# Patient Record
Sex: FEMALE | Race: WHITE | NOT HISPANIC OR LATINO
[De-identification: names, ages, dates, MRNs, and addresses within clinical notes are randomized per-mention and may not be internally consistent; named-entity substitution may affect disease eponyms.]

---

## 2017-01-05 ENCOUNTER — TRANSCRIPTION ENCOUNTER (OUTPATIENT)
Age: 27
End: 2017-01-05

## 2017-06-27 ENCOUNTER — OTHER (OUTPATIENT)
Age: 27
End: 2017-06-27

## 2017-10-11 ENCOUNTER — APPOINTMENT (OUTPATIENT)
Dept: ORTHOPEDIC SURGERY | Facility: CLINIC | Age: 27
End: 2017-10-11

## 2019-04-09 ENCOUNTER — TRANSCRIPTION ENCOUNTER (OUTPATIENT)
Age: 29
End: 2019-04-09

## 2019-04-15 ENCOUNTER — APPOINTMENT (OUTPATIENT)
Dept: ORTHOPEDIC SURGERY | Facility: CLINIC | Age: 29
End: 2019-04-15
Payer: MEDICAID

## 2019-04-15 VITALS
HEIGHT: 62 IN | WEIGHT: 229 LBS | BODY MASS INDEX: 42.14 KG/M2 | DIASTOLIC BLOOD PRESSURE: 80 MMHG | HEART RATE: 86 BPM | SYSTOLIC BLOOD PRESSURE: 125 MMHG

## 2019-04-15 DIAGNOSIS — S82.891A OTHER FRACTURE OF RIGHT LOWER LEG, INITIAL ENCOUNTER FOR CLOSED FRACTURE: ICD-10-CM

## 2019-04-15 PROCEDURE — 73630 X-RAY EXAM OF FOOT: CPT | Mod: RT

## 2019-04-15 PROCEDURE — 99214 OFFICE O/P EST MOD 30 MIN: CPT

## 2019-04-15 NOTE — HISTORY OF PRESENT ILLNESS
[FreeTextEntry1] : 28 year year old female presenting with right foot pain. The patient’s pain is noted to be a 6/10. The patient's pain began in March of 2019. The patient's pain is described as intermittent and burning in nature. The patient states that the burning pain has occurred since February of 2019.  She notes that she gets pain in the outside of her foot while walking. The patient states that her pain is at its worst while walking. She notes that she experiences  severe pain and swelling of the right foot while walking. The patient states that she sustained a right ankle fx in 2001. She notes that she has had chronic RLE pain since the fx. She is currently taking no pain medication. She presents wearing sneakers. The patient is accompanied by a male individual. No other complaints at this time.

## 2019-04-15 NOTE — CONSULT LETTER
[Consult Letter:] : I had the pleasure of evaluating your patient, [unfilled]. [Consult Closing:] : Thank you very much for allowing me to participate in the care of this patient.  If you have any questions, please do not hesitate to contact me. [Please see my note below.] : Please see my note below. [Sincerely,] : Sincerely, [FreeTextEntry2] : SELF,REFERRED [FreeTextEntry3] : Luke Jerome

## 2019-04-15 NOTE — ADDENDUM
[FreeTextEntry1] : I, Speedy Dover, acted solely as a scribe for Dr. Luke Jerome on this date 04/15/2019  .\par  \par All medical record entries made by the Scribe were at my, Dr. Luke Jerome, direction and personally dictated by me on 04/15/2019 . I have reviewed the chart and agree that the record accurately reflects my personal performance of the history, physical exam, assessment and plan. I have also personally directed, reviewed, and agreed with the chart.\par \par \par

## 2019-04-15 NOTE — PHYSICAL EXAM
[de-identified] : General: Alert and oriented x3. In no acute distress. Pleasant in nature with a normal affect. No apparent respiratory distress.\par \par R Foot Exam \par Skin: Clean, dry, intact\par Inspection: No obvious malalignment, no masses, no swelling, no effusion\par Pulses: 2+ DP/PT pulses\par ROM: FOOT Full ROM of digits, ANKLE 10 degrees of dorsiflexion, 40 degrees of plantarflexion, 10 degrees of subtalar motion.\par Painful ROM: None\par Tenderness: +Lis franc region pain. +Distal 3rd 5th MT pain.No tenderness over the medial malleolus, no tenderness over the lateral malleolus, no CFL/ATFL/PTFL pain. No deltoid ligament pain. No heel pain. No  Achilles tenderness. No pain to the LisFranc joint. No ttp over the posterior tibial tendon.\par Stability: Negative anterior/posterior drawer.\par Strength: 5/5 ADD/ABD/TA/GS/EHL/FHL/EDL\par Neuro: Sensation in tact to light touch throughout\par Additional tests: No subluxating or dislocating peroneals. Negative Mortons test, negative tarsal tunnel tinels, negative single heel rise\par \par Bilateral Standing Exam\par External rotation of the right leg. \par \par \par   [de-identified] : 3V of the R foot were ordered obtained and reviewed by me today, 04/15/2019 , revealed: Good bone alignment. No fx. \par \par \par \par

## 2019-04-15 NOTE — DISCUSSION/SUMMARY
[de-identified] : Today I had a lengthy discussion with the patient regarding their right foot pain.I have addressed all the patient's concerns surrounding the pathology of their condition. At this time, I would like to obtain advanced imaging of the patient's right foot in order to rule out a stress fx of the 5th MT. An MRI was ordered of the right foot in order to better understand the etiology of the patient’s condition. She should follow up with the office after completing the MRI. A discussion was had about the possible use of custom vs OTC orthotic inserts. At this time I recommend that the patient start with OTC orthotics. If her pain does not improve with the use of the OTC orthotics then the custom orthotics will be discussed at the patient's follow up visit. Regarding the OTC orthotics, I suggested that the patient utilize Dr. Buck's inserts. I recommend that the patient utilize OTC Biofreeze topically as instructed. I advised the patient to utilize ice, NSAIDs PRN, and heat. They can also elevate their right foot above the level of their heart. The patient understood and verbally agreed to the treatment plan. All of their questions were answered and they were satisfied with the visit. The patient should call the office if they have any questions or experience worsening symptoms.

## 2019-04-16 ENCOUNTER — OUTPATIENT (OUTPATIENT)
Dept: OUTPATIENT SERVICES | Facility: HOSPITAL | Age: 29
LOS: 1 days | End: 2019-04-16

## 2019-04-16 ENCOUNTER — APPOINTMENT (OUTPATIENT)
Dept: MRI IMAGING | Facility: CLINIC | Age: 29
End: 2019-04-16
Payer: MEDICAID

## 2019-04-16 DIAGNOSIS — M25.571 PAIN IN RIGHT ANKLE AND JOINTS OF RIGHT FOOT: ICD-10-CM

## 2019-04-16 PROCEDURE — 73718 MRI LOWER EXTREMITY W/O DYE: CPT | Mod: 26,RT

## 2019-05-01 ENCOUNTER — APPOINTMENT (OUTPATIENT)
Dept: ORTHOPEDIC SURGERY | Facility: CLINIC | Age: 29
End: 2019-05-01
Payer: MEDICAID

## 2019-05-01 PROCEDURE — 99214 OFFICE O/P EST MOD 30 MIN: CPT

## 2019-05-01 NOTE — DISCUSSION/SUMMARY
[de-identified] : Today in the office I had a lengthy discussion with the patient regarding her R foot pain. I have addressed all of the patient's concern surrounding the pathology of their conditions.  I had went over her  MRI result with her in great detail and she has demonstrated understanding. At this time, I am suggesting she f/u with an EMG for the interim. She will f/u with me once she has completed the study. The pt is to call me as soon as possible if they notice any worsening pain or symptoms.  All questions were answered and the patient verbalized understanding. The patient is in agreement with this treatment plan.

## 2019-05-01 NOTE — PHYSICAL EXAM
[de-identified] : General: Alert and oriented x3. In no acute distress. Pleasant in nature with a normal affect. No apparent respiratory distress.\par \par R Foot Exam \par Skin: Clean, dry, intact\par Inspection: No obvious malalignment, no masses, no swelling, no effusion\par Pulses: 2+ DP/PT pulses\par ROM: FOOT Full ROM of digits, ANKLE 10 degrees of dorsiflexion, 40 degrees of plantarflexion, 10 degrees of subtalar motion.\par Painful ROM: None\par Tenderness: +Lis franc region pain. +Distal 3rd 5th MT pain.No tenderness over the medial malleolus, no tenderness over the lateral malleolus, no CFL/ATFL/PTFL pain. No deltoid ligament pain. No heel pain. No  Achilles tenderness. No pain to the LisFranc joint. No ttp over the posterior tibial tendon.\par Stability: Negative anterior/posterior drawer.\par Strength: 5/5 ADD/ABD/TA/GS/EHL/FHL/EDL\par Neuro: Sensation in tact to light touch throughout\par Additional tests: No subluxating or dislocating peroneals. Negative Mortons test, negative tarsal tunnel tinels, negative single heel rise\par \par Bilateral Standing Exam\par External rotation of the right leg. \par \par \par   [de-identified] :  MR FOOT RT from [facility] on 04/16/2019 revealed No stress fracture or stress reaction. No plantar plate tear.

## 2019-05-01 NOTE — HISTORY OF PRESENT ILLNESS
[FreeTextEntry1] : Pt is a 28 year old  F present in the office today in regards to her R foot pain. Pt is here for an MRI review. Pt c/o burning even from laying in bed. Her current pain level is a 6/10. Pt states their pain and swelling is unchanged as compared to the previous visit. She is not currently taking any pain medications at this moment. Pt is accompanied by her father.  Pt is present at the appointment wearing sneakers. No other complaints at this time.

## 2019-05-01 NOTE — ADDENDUM
[FreeTextEntry1] : Documented by Imelda Arteaga acting as a scribe for Dr. Jerome on 05/01/2019 \par \par All medical record entries made by the Scribe were at my, Dr. Petersen, direction and\par personally dictated by me on 05/01/2019 . I have reviewed the chart and agree that the record\par accurately reflects my personal performance of the history, physical exam, procedure and imaging.

## 2020-01-27 ENCOUNTER — LABORATORY RESULT (OUTPATIENT)
Age: 30
End: 2020-01-27

## 2020-01-27 ENCOUNTER — APPOINTMENT (OUTPATIENT)
Dept: ORTHOPEDIC SURGERY | Facility: CLINIC | Age: 30
End: 2020-01-27
Payer: MEDICAID

## 2020-01-27 LAB
INR PPP: 0.93 RATIO
PT BLD: 10.5 SEC

## 2020-01-27 PROCEDURE — 99213 OFFICE O/P EST LOW 20 MIN: CPT

## 2020-01-27 PROCEDURE — 73630 X-RAY EXAM OF FOOT: CPT | Mod: RT

## 2020-01-27 NOTE — PHYSICAL EXAM
[de-identified] : General: Alert and oriented x3. In no acute distress. Pleasant in nature with a normal affect. No apparent respiratory distress.\par R Foot Exam \par Skin: Clean, dry, intact\par Inspection: No obvious malalignment, no masses, no swelling, no effusion\par Pulses: 2+ DP/PT pulses\par ROM: FOOT Full ROM of digits, ANKLE 10 degrees of dorsiflexion, 40 degrees of plantarflexion, 10 degrees of subtalar motion.\par Painful ROM: None\par Tenderness: + slight proximal medial tenderness to the leg. No bony tenderness. No tenderness over the medial malleolus, no tenderness over the lateral malleolus, no CFL/ATFL/PTFL pain. No deltoid ligament pain. No heel pain. No  Achilles tenderness. No pain to the LisFranc joint. No ttp over the posterior tibial tendon.\par Stability: Negative anterior/posterior drawer.\par Strength: 5/5 ADD/ABD/TA/GS/EHL/FHL/EDL\par Neuro: Sensation in tact to light touch throughout\par Additional tests: No subluxating or dislocating peroneals. Negative Mortons test, negative tarsal tunnel tinels, negative single heel rise\par \par Bilateral Standing Exam\par External rotation of the right leg. [de-identified] : MRI of the right foot obtained from an outside facility on 04/16/2019 and reviewed in the office today, 01/27/2020, revealed: No stress fracture of stress reaction. No plantar plate tear.\par \par 3V of the right foot were ordered obtained and reviewed by me today, 01/27/2020, revealed: no acute fracture

## 2020-01-27 NOTE — HISTORY OF PRESENT ILLNESS
[FreeTextEntry1] : 29 year old female presenting with right foot pain. The patient’s pain is noted to be a 6/10. The patient describes her pain as burning in quality. She also complains of stiffness. The pain and swelling are both noted to be the same compared to the previous visit. The patient reports that she could not sit for the EMG. The patient reports that she tried returning to the gym and tried the elliptical for 5 minutes and then the bicycle for 10 minutes with pain. She presents today to review her MRI results. She is currently taking Advil BID. No other complaints at this time.

## 2020-01-27 NOTE — ADDENDUM
[FreeTextEntry1] : I, Frank Corral, acted solely as a scribe for Dr. Luke Jerome on this date 01/27/2020. \par \par All medical record entries made by the Scribe were at my, Dr. Luke Jerome, direction and personally dictated by me on 01/27/2020 . I have reviewed the chart and agree that the record accurately reflects my personal performance of the history, physical exam, assessment and plan. I have also personally directed, reviewed, and agreed with the chart.\par \par

## 2020-01-27 NOTE — DISCUSSION/SUMMARY
[de-identified] : Today I had a lengthy discussion with the patient regarding her right foot pain. I have addressed all the patient's concerns surrounding the pathology of her condition. I recommend that the patient utilize an OTC lidocaine gel topically. At this time I would like to obtain blood work to further evaluate the patient's condition. The blood work was ordered in the office today. A discussion was had about physical therapy vs. a repeat MRI vs. a rheumatology consult vs. a pain management consult. I would like to see the patient back in the office after obtaining the blood work to reassess her condition. The patient understood and verbally agreed to the treatment plan. All of her questions were answered and she was satisfied with the visit. The patient should call the office if she has any questions or experience worsening symptoms.

## 2020-01-28 LAB
ALBUMIN SERPL ELPH-MCNC: 4.4 G/DL
ALP BLD-CCNC: 100 U/L
ALT SERPL-CCNC: 8 U/L
ANION GAP SERPL CALC-SCNC: 14 MMOL/L
AST SERPL-CCNC: 13 U/L
BASOPHILS # BLD AUTO: 0 K/UL
BASOPHILS NFR BLD AUTO: 0 %
BILIRUB SERPL-MCNC: 0.4 MG/DL
BUN SERPL-MCNC: 9 MG/DL
CALCIUM SERPL-MCNC: 9.8 MG/DL
CHLORIDE SERPL-SCNC: 100 MMOL/L
CO2 SERPL-SCNC: 24 MMOL/L
CREAT SERPL-MCNC: 0.65 MG/DL
CRP SERPL-MCNC: 3.57 MG/DL
EOSINOPHIL # BLD AUTO: 0.49 K/UL
EOSINOPHIL NFR BLD AUTO: 3.5 %
ERYTHROCYTE [SEDIMENTATION RATE] IN BLOOD BY WESTERGREN METHOD: 89 MM/HR
ESTIMATED AVERAGE GLUCOSE: 131 MG/DL
GLUCOSE SERPL-MCNC: 125 MG/DL
HBA1C MFR BLD HPLC: 6.2 %
HCT VFR BLD CALC: 37.1 %
HGB BLD-MCNC: 10.7 G/DL
LYMPHOCYTES # BLD AUTO: 3.32 K/UL
LYMPHOCYTES NFR BLD AUTO: 23.5 %
MAN DIFF?: NORMAL
MCHC RBC-ENTMCNC: 20 PG
MCHC RBC-ENTMCNC: 28.8 GM/DL
MCV RBC AUTO: 69.2 FL
MONOCYTES # BLD AUTO: 0.37 K/UL
MONOCYTES NFR BLD AUTO: 2.6 %
NEUTROPHILS # BLD AUTO: 9.83 K/UL
NEUTROPHILS NFR BLD AUTO: 68.7 %
PLATELET # BLD AUTO: 394 K/UL
POTASSIUM SERPL-SCNC: 4.2 MMOL/L
PROT SERPL-MCNC: 7.3 G/DL
RBC # BLD: 5.36 M/UL
RBC # FLD: 15.9 %
RHEUMATOID FACT SER QL: <10 IU/ML
SODIUM SERPL-SCNC: 138 MMOL/L
TSH SERPL-ACNC: 2.5 UIU/ML
URATE SERPL-MCNC: 5.9 MG/DL
WBC # FLD AUTO: 14.14 K/UL

## 2020-01-29 LAB
24R-OH-CALCIDIOL SERPL-MCNC: 109 PG/ML
B BURGDOR AB SER-IMP: NEGATIVE
B BURGDOR IGM PATRN SER IB-IMP: NEGATIVE
B BURGDOR18KD IGG SER QL IB: NORMAL
B BURGDOR23KD IGG SER QL IB: NORMAL
B BURGDOR23KD IGM SER QL IB: NORMAL
B BURGDOR28KD IGG SER QL IB: NORMAL
B BURGDOR30KD IGG SER QL IB: NORMAL
B BURGDOR31KD IGG SER QL IB: NORMAL
B BURGDOR39KD IGG SER QL IB: NORMAL
B BURGDOR39KD IGM SER QL IB: NORMAL
B BURGDOR41KD IGG SER QL IB: PRESENT
B BURGDOR41KD IGM SER QL IB: NORMAL
B BURGDOR45KD IGG SER QL IB: NORMAL
B BURGDOR58KD IGG SER QL IB: NORMAL
B BURGDOR66KD IGG SER QL IB: NORMAL
B BURGDOR93KD IGG SER QL IB: NORMAL
HLA-B27 RELATED AG QL: NORMAL

## 2020-01-30 LAB — ANA SER IF-ACNC: NEGATIVE

## 2020-02-03 ENCOUNTER — APPOINTMENT (OUTPATIENT)
Dept: ORTHOPEDIC SURGERY | Facility: CLINIC | Age: 30
End: 2020-02-03

## 2020-05-27 ENCOUNTER — APPOINTMENT (OUTPATIENT)
Dept: RHEUMATOLOGY | Facility: CLINIC | Age: 30
End: 2020-05-27

## 2020-06-03 ENCOUNTER — APPOINTMENT (OUTPATIENT)
Dept: RHEUMATOLOGY | Facility: CLINIC | Age: 30
End: 2020-06-03

## 2020-07-08 ENCOUNTER — LABORATORY RESULT (OUTPATIENT)
Age: 30
End: 2020-07-08

## 2020-07-08 ENCOUNTER — APPOINTMENT (OUTPATIENT)
Dept: RHEUMATOLOGY | Facility: CLINIC | Age: 30
End: 2020-07-08
Payer: MEDICAID

## 2020-07-08 VITALS
RESPIRATION RATE: 16 BRPM | HEART RATE: 120 BPM | SYSTOLIC BLOOD PRESSURE: 138 MMHG | DIASTOLIC BLOOD PRESSURE: 84 MMHG | HEIGHT: 62 IN | OXYGEN SATURATION: 97 % | BODY MASS INDEX: 41.96 KG/M2 | WEIGHT: 228 LBS

## 2020-07-08 VITALS — TEMPERATURE: 98.3 F

## 2020-07-08 DIAGNOSIS — K82.9 DISEASE OF GALLBLADDER, UNSPECIFIED: ICD-10-CM

## 2020-07-08 PROCEDURE — 99205 OFFICE O/P NEW HI 60 MIN: CPT

## 2020-07-08 RX ORDER — DICLOFENAC SODIUM 10 MG/G
1 GEL TOPICAL
Qty: 1 | Refills: 3 | Status: DISCONTINUED | COMMUNITY
Start: 2019-05-01 | End: 2020-07-08

## 2020-07-08 RX ORDER — MONTELUKAST SODIUM 10 MG/1
TABLET, FILM COATED ORAL
Refills: 0 | Status: ACTIVE | COMMUNITY

## 2020-07-08 NOTE — HISTORY OF PRESENT ILLNESS
[FreeTextEntry1] : 29 year old female with PMH of depression, fibromyalgia, presents today for an initial evaluation. \par \par Reports to have chronic, 2 year hx of intermittent right foot and ankle pain. Denies swelling. Pain is dull/aching/burning sensation. Has tried NSAIDs with no relief of symptoms.  Denies trauma. Denies tick bites. Symptoms worse with ambulating, worse with activity. Evaluated by ortho- s/p MRI Right foot 04/2019: unremarkable. Labs with anemia, elevated inflammatory markers. Evaluated by neurology who recommended EMG. However pt not able to tolerate study. \par \par Denies pain/swelling to other joints. Notes to have prior hx of Right ankle fx. denies asymmetry of temperature/ skin or color changes, edema, sweating, trophic changes\par \par She is up to date with age appropriate malignancy screening. \par \par denies fever, chills, fatigue, malaise, denies dry eye,eye pain, eye redness, vision changes, dry mouth, oral ulcers, nasal congestion, difficulty swallowing,  denies ear pain, hearing changes, denies chest pain, chest palpitations, denies sob, denies nausea, vomiting, abdominal pain, diarrhea, constipation, blood in stool, denies dysuria, blood in the urine,  denies rashes, photosensitivity, hair loss, skin thickening, denies blood clots,  raynauds

## 2020-07-08 NOTE — PHYSICAL EXAM
[General Appearance - In No Acute Distress] : in no acute distress [General Appearance - Well Nourished] : well nourished [General Appearance - Alert] : alert [General Appearance - Well Developed] : well developed [PERRL With Normal Accommodation] : pupils were equal in size, round, and reactive to light [Outer Ear] : the ears and nose were normal in appearance [Sclera] : the sclera and conjunctiva were normal [Neck Appearance] : the appearance of the neck was normal [Auscultation Breath Sounds / Voice Sounds] : lungs were clear to auscultation bilaterally [Heart Sounds] : normal S1 and S2 [Heart Rate And Rhythm] : heart rate was normal and rhythm regular [Murmurs] : no murmurs [Heart Sounds Gallop] : no gallops [Heart Sounds Pericardial Friction Rub] : no pericardial rub [Bowel Sounds] : normal bowel sounds [Abdomen Soft] : soft [No CVA Tenderness] : no ~M costovertebral angle tenderness [Abdomen Tenderness] : non-tender [No Spinal Tenderness] : no spinal tenderness [Nail Clubbing] : no clubbing  or cyanosis of the fingernails [Abnormal Walk] : normal gait [Musculoskeletal - Swelling] : no joint swelling seen [] : no rash [Skin Lesions] : no skin lesions [Motor Tone] : muscle strength and tone were normal [Oriented To Time, Place, And Person] : oriented to person, place, and time [No Focal Deficits] : no focal deficits

## 2020-07-08 NOTE — ASSESSMENT
[FreeTextEntry1] : 29 year old female with PMH of depression, fibromyalgia, presents today for an initial evaluation. Reports to have chronic, 2 year hx of intermittent right foot and ankle pain. Denies swelling. Denies trauma. Denies tick bites. as tried NSAIDs with no relief of symptoms.  MRI Right foot 04/2019: unremarkable. Labs with anemia, elevated inflammatory markers. Evaluated by neurology who recommended EMG. However pt not able to tolerate study. \par ROS and PE otherwise unremarkable for underlying classic CTD/ systemic autoimmune disease. \par \par Unclear etiology of symptoms at this point. RDS?? as pt has hx of Right ankle fx. denies asymmetry of temperature/ skin or color changes, edema, sweating, trophic changes. \par \par - trend inflammatory makers, labs as below. she is up to date with age appropriate malignancy screening. \par - on duloxetine 30 mg daily for depression\par - start gabapentin 300mg daily (can up titrate if tolerates)\par - neurology f.u for EMG\par \par Discussed treatment plan with the patient. The patient was given the opportunity to ask questions and all questions were answered to their satisfaction.

## 2020-07-21 LAB
ALBUMIN MFR SERPL ELPH: 54.9 %
ALBUMIN SERPL-MCNC: 3.9 G/DL
ALBUMIN/GLOB SERPL: 1.2 RATIO
ALPHA1 GLOB MFR SERPL ELPH: 5.3 %
ALPHA1 GLOB SERPL ELPH-MCNC: 0.4 G/DL
ALPHA2 GLOB MFR SERPL ELPH: 11 %
ALPHA2 GLOB SERPL ELPH-MCNC: 0.8 G/DL
B-GLOBULIN MFR SERPL ELPH: 14 %
B-GLOBULIN SERPL ELPH-MCNC: 1 G/DL
GAMMA GLOB FLD ELPH-MCNC: 1.1 G/DL
GAMMA GLOB MFR SERPL ELPH: 14.8 %
IGA 24H UR QL IFE: NORMAL
INTERPRETATION SERPL IEP-IMP: NORMAL
M PROTEIN SPEC IFE-MCNC: NORMAL
PROT SERPL-MCNC: 7.1 G/DL
PROT SERPL-MCNC: 7.1 G/DL

## 2020-08-12 ENCOUNTER — APPOINTMENT (OUTPATIENT)
Dept: RHEUMATOLOGY | Facility: CLINIC | Age: 30
End: 2020-08-12
Payer: MEDICAID

## 2020-08-12 VITALS
OXYGEN SATURATION: 98 % | SYSTOLIC BLOOD PRESSURE: 120 MMHG | HEIGHT: 62 IN | DIASTOLIC BLOOD PRESSURE: 80 MMHG | BODY MASS INDEX: 41.96 KG/M2 | WEIGHT: 228 LBS | RESPIRATION RATE: 17 BRPM | HEART RATE: 118 BPM | TEMPERATURE: 98.1 F

## 2020-08-12 DIAGNOSIS — R70.0 ELEVATED ERYTHROCYTE SEDIMENTATION RATE: ICD-10-CM

## 2020-08-12 DIAGNOSIS — R79.82 ELEVATED C-REACTIVE PROTEIN (CRP): ICD-10-CM

## 2020-08-12 PROCEDURE — 99213 OFFICE O/P EST LOW 20 MIN: CPT

## 2020-08-12 RX ORDER — FLUTICASONE PROPIONATE AND SALMETEROL 500; 50 UG/1; UG/1
POWDER RESPIRATORY (INHALATION)
Refills: 0 | Status: DISCONTINUED | COMMUNITY
End: 2020-08-12

## 2020-08-12 RX ORDER — NORETHINDRONE AND ETHINYL ESTRADIOL AND FERROUS FUMARATE 0.8-25(24)
0.8-25 KIT ORAL
Refills: 0 | Status: DISCONTINUED | COMMUNITY
End: 2020-08-12

## 2020-08-12 RX ORDER — ALBUTEROL SULFATE 4 MG/1
TABLET ORAL
Refills: 0 | Status: DISCONTINUED | COMMUNITY
End: 2020-08-12

## 2020-08-12 RX ORDER — FLUTICASONE PROPIONATE 50 UG/1
50 SPRAY, METERED NASAL
Refills: 0 | Status: DISCONTINUED | COMMUNITY
End: 2020-08-12

## 2020-08-12 RX ORDER — MIRTAZAPINE 15 MG/1
15 TABLET, FILM COATED ORAL
Refills: 0 | Status: DISCONTINUED | COMMUNITY
End: 2020-08-12

## 2020-08-12 NOTE — PHYSICAL EXAM
[General Appearance - Alert] : alert [General Appearance - Well Nourished] : well nourished [General Appearance - In No Acute Distress] : in no acute distress [General Appearance - Well Developed] : well developed [PERRL With Normal Accommodation] : pupils were equal in size, round, and reactive to light [Sclera] : the sclera and conjunctiva were normal [Neck Appearance] : the appearance of the neck was normal [Outer Ear] : the ears and nose were normal in appearance [Auscultation Breath Sounds / Voice Sounds] : lungs were clear to auscultation bilaterally [Heart Sounds Gallop] : no gallops [Heart Sounds] : normal S1 and S2 [Heart Rate And Rhythm] : heart rate was normal and rhythm regular [Heart Sounds Pericardial Friction Rub] : no pericardial rub [Murmurs] : no murmurs [Bowel Sounds] : normal bowel sounds [Abdomen Tenderness] : non-tender [Abdomen Soft] : soft [No CVA Tenderness] : no ~M costovertebral angle tenderness [No Spinal Tenderness] : no spinal tenderness [Abnormal Walk] : normal gait [Nail Clubbing] : no clubbing  or cyanosis of the fingernails [Musculoskeletal - Swelling] : no joint swelling seen [] : no rash [Motor Tone] : muscle strength and tone were normal [Skin Lesions] : no skin lesions [No Focal Deficits] : no focal deficits [Oriented To Time, Place, And Person] : oriented to person, place, and time

## 2020-08-12 NOTE — ASSESSMENT
[FreeTextEntry1] : 29 year old female with PMH of depression, fibromyalgia, presents today for f/u visit. Reports to have chronic, 2 year hx of intermittent right foot and ankle pain. Denies swelling. Denies trauma.  MRI Right foot 04/2019: unremarkable. Labs with anemia, elevated inflammatory markers. Evaluated by neurology who recommended EMG. However pt not able to tolerate study. \par \par Currently doing well on gabapentin 300mg TID. Tolerating medication well. Denies side effects.\par ROS and PE otherwise unremarkable for underlying classic CTD/ systemic autoimmune disease. \par \par Unclear etiology of symptoms at this point. RDS?? as pt has hx of Right ankle fx. denies asymmetry of temperature/ skin or color changes, edema, sweating, trophic changes. \par \par - trend inflammatory makers, labs as below. she is up to date with age appropriate malignancy screening. \par - on duloxetine 30 mg daily for depression, consider increasing dose  \par - c/w gabapentin 300mg TID (can up titrate if tolerates)\par - neurology f.u for EMG\par \par Discussed treatment plan with the patient. The patient was given the opportunity to ask questions and all questions were answered to their satisfaction.

## 2020-11-11 ENCOUNTER — APPOINTMENT (OUTPATIENT)
Dept: RHEUMATOLOGY | Facility: CLINIC | Age: 30
End: 2020-11-11
Payer: MEDICAID

## 2020-11-11 VITALS
SYSTOLIC BLOOD PRESSURE: 116 MMHG | RESPIRATION RATE: 17 BRPM | TEMPERATURE: 98 F | HEIGHT: 62 IN | HEART RATE: 111 BPM | DIASTOLIC BLOOD PRESSURE: 80 MMHG | OXYGEN SATURATION: 97 %

## 2020-11-11 PROCEDURE — 99214 OFFICE O/P EST MOD 30 MIN: CPT | Mod: 25

## 2020-11-11 PROCEDURE — 99072 ADDL SUPL MATRL&STAF TM PHE: CPT

## 2020-11-11 NOTE — HISTORY OF PRESENT ILLNESS
[FreeTextEntry1] : Pt presenting today for a f.u visit. Currently on gabapentin 300mg TID, duloxetine 30 mg daily. Had initial improvement on gabapentin, however now reports to have no improvement. Presenting today with continued intermittent right foot and ankle pain. Denies swelling. Recently evaluated by psychiatry and she was told not to increase her duloxetine due to medication interactions.  \par denies fever, chills, fatigue, malaise, chest pain, chest palpitations, denies sob, denies nausea, vomiting, abdominal pain, diarrhea, constipation, blood in stool, blood in the urine,  denies rashes, denies blood clots,  raynauds

## 2020-11-11 NOTE — ASSESSMENT
[FreeTextEntry1] : 29 year old female with PMH of depression, fibromyalgia, presents today for f/u visit. Reports to have chronic, 2 year hx of intermittent right foot and ankle pain. Denies swelling. Denies trauma.  MRI Right foot 04/2019: unremarkable. Labs with anemia, elevated inflammatory markers. Evaluated by neurology who recommended EMG. However pt not able to tolerate study. \par \par Currently on gabapentin 300mg TID, duloxetine 30 mg daily with no significant relief.  \par ROS and PE otherwise unremarkable for underlying classic CTD/ systemic autoimmune disease. \par \par Unclear etiology of symptoms at this point. RDS?? as pt has hx of Right ankle fx. denies asymmetry of temperature/ skin or color changes, edema, sweating, trophic changes. \par \par - trend inflammatory makers. she is up to date with age appropriate malignancy screening. \par - on duloxetine 30 mg daily for depression.  Per pt psychiatry recommend not to increase her duloxetine due to medication interactions.  \par - stop gabapentin\par - neurology f.u for EMG\par \par Discussed treatment plan with the patient. The patient was given the opportunity to ask questions and all questions were answered to their satisfaction.

## 2020-11-11 NOTE — PHYSICAL EXAM
[General Appearance - Alert] : alert [General Appearance - In No Acute Distress] : in no acute distress [General Appearance - Well Nourished] : well nourished [General Appearance - Well Developed] : well developed [Sclera] : the sclera and conjunctiva were normal [PERRL With Normal Accommodation] : pupils were equal in size, round, and reactive to light [Outer Ear] : the ears and nose were normal in appearance [Neck Appearance] : the appearance of the neck was normal [Auscultation Breath Sounds / Voice Sounds] : lungs were clear to auscultation bilaterally [Heart Rate And Rhythm] : heart rate was normal and rhythm regular [Heart Sounds] : normal S1 and S2 [Heart Sounds Gallop] : no gallops [Murmurs] : no murmurs [Heart Sounds Pericardial Friction Rub] : no pericardial rub [Bowel Sounds] : normal bowel sounds [Abdomen Soft] : soft [Abdomen Tenderness] : non-tender [No CVA Tenderness] : no ~M costovertebral angle tenderness [No Spinal Tenderness] : no spinal tenderness [Abnormal Walk] : normal gait [Nail Clubbing] : no clubbing  or cyanosis of the fingernails [Musculoskeletal - Swelling] : no joint swelling seen [Motor Tone] : muscle strength and tone were normal [] : no rash [Skin Lesions] : no skin lesions [No Focal Deficits] : no focal deficits [Oriented To Time, Place, And Person] : oriented to person, place, and time

## 2020-11-30 RX ORDER — DULOXETINE HYDROCHLORIDE 60 MG/1
60 CAPSULE, DELAYED RELEASE PELLETS ORAL
Qty: 30 | Refills: 1 | Status: ACTIVE | COMMUNITY
Start: 1900-01-01 | End: 1900-01-01

## 2021-01-19 ENCOUNTER — APPOINTMENT (OUTPATIENT)
Dept: RHEUMATOLOGY | Facility: CLINIC | Age: 31
End: 2021-01-19
Payer: MEDICAID

## 2021-01-19 VITALS
HEART RATE: 109 BPM | DIASTOLIC BLOOD PRESSURE: 82 MMHG | SYSTOLIC BLOOD PRESSURE: 112 MMHG | BODY MASS INDEX: 42.33 KG/M2 | OXYGEN SATURATION: 97 % | TEMPERATURE: 97.6 F | RESPIRATION RATE: 17 BRPM | WEIGHT: 230 LBS | HEIGHT: 62 IN

## 2021-01-19 PROCEDURE — 99214 OFFICE O/P EST MOD 30 MIN: CPT

## 2021-01-19 PROCEDURE — 99072 ADDL SUPL MATRL&STAF TM PHE: CPT

## 2021-01-19 RX ORDER — GABAPENTIN 300 MG/1
300 CAPSULE ORAL
Qty: 90 | Refills: 3 | Status: DISCONTINUED | COMMUNITY
Start: 2020-07-08 | End: 2021-01-19

## 2021-01-19 NOTE — PHYSICAL EXAM
[General Appearance - Alert] : alert [General Appearance - In No Acute Distress] : in no acute distress [General Appearance - Well Nourished] : well nourished [General Appearance - Well Developed] : well developed [PERRL With Normal Accommodation] : pupils were equal in size, round, and reactive to light [Sclera] : the sclera and conjunctiva were normal [Outer Ear] : the ears and nose were normal in appearance [Neck Appearance] : the appearance of the neck was normal [Auscultation Breath Sounds / Voice Sounds] : lungs were clear to auscultation bilaterally [Heart Rate And Rhythm] : heart rate was normal and rhythm regular [Heart Sounds] : normal S1 and S2 [Heart Sounds Gallop] : no gallops [Murmurs] : no murmurs [Heart Sounds Pericardial Friction Rub] : no pericardial rub [Bowel Sounds] : normal bowel sounds [Abdomen Soft] : soft [Abdomen Tenderness] : non-tender [No CVA Tenderness] : no ~M costovertebral angle tenderness [No Spinal Tenderness] : no spinal tenderness [Abnormal Walk] : normal gait [Nail Clubbing] : no clubbing  or cyanosis of the fingernails [Musculoskeletal - Swelling] : no joint swelling seen [Motor Tone] : muscle strength and tone were normal [] : no rash [Skin Lesions] : no skin lesions [No Focal Deficits] : no focal deficits [Oriented To Time, Place, And Person] : oriented to person, place, and time

## 2021-01-20 NOTE — ASSESSMENT
[FreeTextEntry1] : 29 year old female with PMH of depression, fibromyalgia, presents today for f/u visit. Reports to have chronic, 2 year hx of intermittent right foot and ankle pain. Denies swelling. Denies trauma.  MRI Right foot 04/2019: unremarkable. Labs with anemia, elevated inflammatory markers. Evaluated by neurology who recommended EMG. However pt not able to tolerate study. \par \par Currently doing well on  gabapentin 300mg TID, duloxetine 30 mg daily, cyclobenzaprine 5mg daily \par ROS and PE otherwise unremarkable for underlying classic CTD/ systemic autoimmune disease. \par \par Unclear etiology of symptoms at this point. RDS?? as pt has hx of Right ankle fx. denies asymmetry of temperature/ skin or color changes, edema, sweating, trophic changes. \par \par - trend inflammatory makers. she is up to date with age appropriate malignancy screening. \par - on duloxetine 30 mg daily for depression.  Per pt psychiatry recommend not to increase her duloxetine due to medication interactions. \par - c/w gabapentin 300mg TID\par - neurology f.u for EMG\par - c/w cyclobenzaprine 5mg daily prn at bedtime \par \par Discussed treatment plan with the patient. The patient was given the opportunity to ask questions and all questions were answered to their satisfaction.

## 2021-01-20 NOTE — HISTORY OF PRESENT ILLNESS
[FreeTextEntry1] : Pt presenting today for a f.u visit. Currently on gabapentin 300mg TID, duloxetine 30 mg daily, cyclobenzaprine 5mg daily prn at bedtime Tolerating medication well. Denies side effects. Currently asymptomatic. Denies pain to right foot or ankle pain. Denies swelling.\par denies fever, chills, fatigue, malaise, chest pain, chest palpitations, denies sob, denies nausea, vomiting, abdominal pain, diarrhea, constipation, blood in stool, blood in the urine,  denies rashes, denies blood clots,  raynauds

## 2021-05-04 ENCOUNTER — NON-APPOINTMENT (OUTPATIENT)
Age: 31
End: 2021-05-04

## 2021-05-11 ENCOUNTER — APPOINTMENT (OUTPATIENT)
Dept: RHEUMATOLOGY | Facility: CLINIC | Age: 31
End: 2021-05-11
Payer: MEDICAID

## 2021-05-11 VITALS
OXYGEN SATURATION: 97 % | RESPIRATION RATE: 17 BRPM | WEIGHT: 230 LBS | BODY MASS INDEX: 42.33 KG/M2 | HEART RATE: 116 BPM | SYSTOLIC BLOOD PRESSURE: 120 MMHG | HEIGHT: 62 IN | DIASTOLIC BLOOD PRESSURE: 84 MMHG

## 2021-05-11 DIAGNOSIS — R20.8 OTHER DISTURBANCES OF SKIN SENSATION: ICD-10-CM

## 2021-05-11 DIAGNOSIS — M79.604 PAIN IN RIGHT LEG: ICD-10-CM

## 2021-05-11 DIAGNOSIS — M79.7 FIBROMYALGIA: ICD-10-CM

## 2021-05-11 PROCEDURE — 99072 ADDL SUPL MATRL&STAF TM PHE: CPT

## 2021-05-11 PROCEDURE — 99213 OFFICE O/P EST LOW 20 MIN: CPT

## 2021-05-11 RX ORDER — CYCLOBENZAPRINE HYDROCHLORIDE 10 MG/1
10 TABLET, FILM COATED ORAL
Qty: 30 | Refills: 2 | Status: DISCONTINUED | COMMUNITY
Start: 2020-11-11 | End: 2021-05-11

## 2021-05-11 NOTE — ASSESSMENT
[FreeTextEntry1] : 29 year old female with PMH of depression, fibromyalgia, presents today for f/u visit. Reports to have chronic, 2 year hx of intermittent right foot and ankle pain. Denies swelling. Denies trauma.  MRI Right foot 04/2019: unremarkable. Labs with anemia, elevated inflammatory markers. Evaluated by neurology who recommended EMG. However pt not able to tolerate study. \par \par Currently doing well on  gabapentin 100 mg daily, duloxetine 30 mg daily.\par ROS and PE otherwise unremarkable for underlying classic CTD/ systemic autoimmune disease. \par \par Unclear etiology of symptoms at this point. RDS?? as pt has hx of Right ankle fx. denies asymmetry of temperature/ skin or color changes, edema, sweating, trophic changes. \par \par - trend inflammatory makers. she is up to date with age appropriate malignancy screening. \par - on duloxetine 30 mg daily for depression. Psychiatry recommend not to increase her duloxetine due to medication interactions. \par - c/w gabapentin 100 mg daily. Dose decreased by psychiatry\par - neurology f.u for EMG\par \par Discussed treatment plan with the patient and her father. The patient was given the opportunity to ask questions and all questions were answered to their satisfaction.

## 2021-05-11 NOTE — HISTORY OF PRESENT ILLNESS
[FreeTextEntry1] : Pt presenting today for a f.u visit. Currently on gabapentin 100 mg daily( dose decreased  by psychiatry), duloxetine 30 mg daily. Off cyclobenzaprine now-stopped working.  Tolerating medication well. Denies side effects. Currently asymptomatic. Denies pain to right foot or ankle pain. Denies swelling.\par denies fever, chills, fatigue, malaise, chest pain, chest palpitations, denies sob, denies nausea, vomiting, abdominal pain, denies rashes. No current complaints.

## 2021-05-11 NOTE — PHYSICAL EXAM
[General Appearance - Alert] : alert [General Appearance - In No Acute Distress] : in no acute distress [General Appearance - Well Nourished] : well nourished [General Appearance - Well Developed] : well developed [Sclera] : the sclera and conjunctiva were normal [PERRL With Normal Accommodation] : pupils were equal in size, round, and reactive to light [Outer Ear] : the ears and nose were normal in appearance [Neck Appearance] : the appearance of the neck was normal [Auscultation Breath Sounds / Voice Sounds] : lungs were clear to auscultation bilaterally [Heart Rate And Rhythm] : heart rate was normal and rhythm regular [Heart Sounds] : normal S1 and S2 [Heart Sounds Gallop] : no gallops [Murmurs] : no murmurs [Heart Sounds Pericardial Friction Rub] : no pericardial rub [Bowel Sounds] : normal bowel sounds [Abdomen Soft] : soft [Abdomen Tenderness] : non-tender [No CVA Tenderness] : no ~M costovertebral angle tenderness [No Spinal Tenderness] : no spinal tenderness [Abnormal Walk] : normal gait [Nail Clubbing] : no clubbing  or cyanosis of the fingernails [Musculoskeletal - Swelling] : no joint swelling seen [Motor Tone] : muscle strength and tone were normal [] : no rash [Skin Lesions] : no skin lesions [No Focal Deficits] : no focal deficits [Oriented To Time, Place, And Person] : oriented to person, place, and time [FreeTextEntry1] : over weight

## 2021-09-17 ENCOUNTER — NON-APPOINTMENT (OUTPATIENT)
Age: 31
End: 2021-09-17

## 2021-09-17 ENCOUNTER — APPOINTMENT (OUTPATIENT)
Dept: ORTHOPEDIC SURGERY | Facility: CLINIC | Age: 31
End: 2021-09-17
Payer: MEDICAID

## 2021-09-17 DIAGNOSIS — G89.29 PAIN IN RIGHT ANKLE AND JOINTS OF RIGHT FOOT: ICD-10-CM

## 2021-09-17 DIAGNOSIS — M25.571 PAIN IN RIGHT ANKLE AND JOINTS OF RIGHT FOOT: ICD-10-CM

## 2021-09-17 DIAGNOSIS — M79.671 PAIN IN RIGHT FOOT: ICD-10-CM

## 2021-09-17 PROCEDURE — 99213 OFFICE O/P EST LOW 20 MIN: CPT

## 2021-09-17 RX ORDER — ALBUTEROL SULFATE 90 UG/1
108 (90 BASE) INHALANT RESPIRATORY (INHALATION)
Qty: 7 | Refills: 0 | Status: ACTIVE | COMMUNITY
Start: 2021-05-03

## 2021-09-17 RX ORDER — GABAPENTIN 100 MG/1
100 CAPSULE ORAL
Qty: 270 | Refills: 0 | Status: ACTIVE | COMMUNITY
Start: 2021-05-05

## 2021-09-17 RX ORDER — DULOXETINE HYDROCHLORIDE 30 MG/1
30 CAPSULE, DELAYED RELEASE PELLETS ORAL
Qty: 90 | Refills: 0 | Status: ACTIVE | COMMUNITY
Start: 2021-05-28

## 2021-09-17 NOTE — PHYSICAL EXAM
[de-identified] : General: Alert and oriented x3. In no acute distress. Pleasant in nature with a normal affect. No apparent respiratory distress.\par R Foot Exam \par Skin: Clean, dry, intact\par Inspection: No obvious malalignment, no masses, no swelling, no effusion\par Pulses: 2+ DP/PT pulses\par ROM: FOOT Full ROM of digits, ANKLE Neutral degrees of dorsiflexion, 40 degrees of plantarflexion, 10 degrees of subtalar motion.\par Painful ROM: None\par Tenderness: No bony tenderness. No tenderness over the medial malleolus, no tenderness over the lateral malleolus, no CFL/ATFL/PTFL pain. No deltoid ligament pain. No heel pain. No  Achilles tenderness. No pain to the LisFranc joint. No ttp over the posterior tibial tendon.\par Stability: Negative anterior/posterior drawer.\par Strength: 5/5 ADD/ABD/TA/GS/EHL/FHL/EDL\par Neuro: Sensation in tact to light touch throughout\par Additional tests: No subluxating or dislocating peroneals. Negative Mortons test, negative tarsal tunnel tinels, negative single heel rise\par  [de-identified] : 3V of the right ankle were ordered, obtained, and reviewed by me today, 09/17/2021, revealed: No acute fractures. \par

## 2021-09-17 NOTE — ADDENDUM
[FreeTextEntry1] : I, Mónica Gregorio, acted solely as a scribe for Dr. Luke Jerome on this date 09/17/2021.\par \par All medical record entries made by the Scribe were at my, Dr. Luke Jerome, direction and personally dictated by me on 09/17/2021 . I have reviewed the chart and agree that the record accurately reflects my personal performance of the history, physical exam, assessment and plan. I have also personally directed, reviewed, and agreed with the chart.	\par

## 2021-09-17 NOTE — HISTORY OF PRESENT ILLNESS
[FreeTextEntry1] : 29 year old female presenting with right ankle pain. The patient’s pain is noted to be a 5/10 localized to her anterior ankle. The patient describes her pain as sharp in quality. She denies any burning sensations. She also complains of stiffness to the ankle. The pain and swelling are both noted to be the worsened compared to the previous visit. She confirms a significant MHx of Fibromyalgia. She cannot take anti-inflammatories pills due to MHx.  No other complaints at this time. She has been out of work.

## 2021-11-09 ENCOUNTER — APPOINTMENT (OUTPATIENT)
Dept: RHEUMATOLOGY | Facility: CLINIC | Age: 31
End: 2021-11-09

## 2021-12-08 ENCOUNTER — APPOINTMENT (OUTPATIENT)
Dept: RHEUMATOLOGY | Facility: CLINIC | Age: 31
End: 2021-12-08

## 2022-01-12 ENCOUNTER — TRANSCRIPTION ENCOUNTER (OUTPATIENT)
Age: 32
End: 2022-01-12

## 2022-01-20 ENCOUNTER — TRANSCRIPTION ENCOUNTER (OUTPATIENT)
Age: 32
End: 2022-01-20

## 2022-04-10 ENCOUNTER — TRANSCRIPTION ENCOUNTER (OUTPATIENT)
Age: 32
End: 2022-04-10

## 2022-11-07 ENCOUNTER — NON-APPOINTMENT (OUTPATIENT)
Age: 32
End: 2022-11-07

## 2023-03-30 ENCOUNTER — NON-APPOINTMENT (OUTPATIENT)
Age: 33
End: 2023-03-30

## 2023-07-29 ENCOUNTER — NON-APPOINTMENT (OUTPATIENT)
Age: 33
End: 2023-07-29

## 2023-09-24 ENCOUNTER — NON-APPOINTMENT (OUTPATIENT)
Age: 33
End: 2023-09-24

## 2024-01-14 ENCOUNTER — NON-APPOINTMENT (OUTPATIENT)
Age: 34
End: 2024-01-14

## 2024-08-19 ENCOUNTER — NON-APPOINTMENT (OUTPATIENT)
Age: 34
End: 2024-08-19

## 2024-11-14 ENCOUNTER — NON-APPOINTMENT (OUTPATIENT)
Age: 34
End: 2024-11-14

## 2024-11-19 ENCOUNTER — NON-APPOINTMENT (OUTPATIENT)
Age: 34
End: 2024-11-19

## 2025-02-10 ENCOUNTER — NON-APPOINTMENT (OUTPATIENT)
Age: 35
End: 2025-02-10

## 2025-03-31 ENCOUNTER — NON-APPOINTMENT (OUTPATIENT)
Age: 35
End: 2025-03-31

## 2025-04-04 ENCOUNTER — NON-APPOINTMENT (OUTPATIENT)
Age: 35
End: 2025-04-04

## 2025-06-26 ENCOUNTER — NON-APPOINTMENT (OUTPATIENT)
Age: 35
End: 2025-06-26

## 2025-08-21 ENCOUNTER — NON-APPOINTMENT (OUTPATIENT)
Age: 35
End: 2025-08-21